# Patient Record
Sex: MALE | Race: WHITE | NOT HISPANIC OR LATINO | Employment: PART TIME | URBAN - METROPOLITAN AREA
[De-identification: names, ages, dates, MRNs, and addresses within clinical notes are randomized per-mention and may not be internally consistent; named-entity substitution may affect disease eponyms.]

---

## 2018-01-31 ENCOUNTER — HOSPITAL ENCOUNTER (EMERGENCY)
Facility: MEDICAL CENTER | Age: 38
End: 2018-02-01
Attending: EMERGENCY MEDICINE
Payer: COMMERCIAL

## 2018-01-31 ENCOUNTER — APPOINTMENT (OUTPATIENT)
Dept: RADIOLOGY | Facility: MEDICAL CENTER | Age: 38
End: 2018-01-31
Attending: EMERGENCY MEDICINE
Payer: COMMERCIAL

## 2018-01-31 DIAGNOSIS — S62.309A CLOSED FRACTURE OF METACARPAL BONE, UNSPECIFIED FRACTURE MORPHOLOGY, UNSPECIFIED METACARPAL, UNSPECIFIED PORTION OF METACARPAL, INITIAL ENCOUNTER: ICD-10-CM

## 2018-01-31 PROCEDURE — A9270 NON-COVERED ITEM OR SERVICE: HCPCS | Performed by: EMERGENCY MEDICINE

## 2018-01-31 PROCEDURE — 99284 EMERGENCY DEPT VISIT MOD MDM: CPT

## 2018-01-31 PROCEDURE — 73130 X-RAY EXAM OF HAND: CPT | Mod: RT

## 2018-01-31 PROCEDURE — 90471 IMMUNIZATION ADMIN: CPT

## 2018-01-31 PROCEDURE — 700111 HCHG RX REV CODE 636 W/ 250 OVERRIDE (IP): Performed by: EMERGENCY MEDICINE

## 2018-01-31 PROCEDURE — 700102 HCHG RX REV CODE 250 W/ 637 OVERRIDE(OP): Performed by: EMERGENCY MEDICINE

## 2018-01-31 PROCEDURE — 90715 TDAP VACCINE 7 YRS/> IM: CPT | Performed by: EMERGENCY MEDICINE

## 2018-01-31 RX ORDER — IBUPROFEN 600 MG/1
600 TABLET ORAL ONCE
Status: COMPLETED | OUTPATIENT
Start: 2018-01-31 | End: 2018-01-31

## 2018-01-31 RX ADMIN — IBUPROFEN 600 MG: 600 TABLET, FILM COATED ORAL at 23:30

## 2018-01-31 RX ADMIN — CLOSTRIDIUM TETANI TOXOID ANTIGEN (FORMALDEHYDE INACTIVATED), CORYNEBACTERIUM DIPHTHERIAE TOXOID ANTIGEN (FORMALDEHYDE INACTIVATED), BORDETELLA PERTUSSIS TOXOID ANTIGEN (GLUTARALDEHYDE INACTIVATED), BORDETELLA PERTUSSIS FILAMENTOUS HEMAGGLUTININ ANTIGEN (FORMALDEHYDE INACTIVATED), BORDETELLA PERTUSSIS PERTACTIN ANTIGEN, AND BORDETELLA PERTUSSIS FIMBRIAE 2/3 ANTIGEN 0.5 ML: 5; 2; 2.5; 5; 3; 5 INJECTION, SUSPENSION INTRAMUSCULAR at 23:31

## 2018-01-31 ASSESSMENT — PAIN SCALES - GENERAL: PAINLEVEL_OUTOF10: 8
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
## 2018-02-01 VITALS
RESPIRATION RATE: 16 BRPM | OXYGEN SATURATION: 99 % | TEMPERATURE: 97.5 F | BODY MASS INDEX: 23.32 KG/M2 | HEART RATE: 97 BPM | DIASTOLIC BLOOD PRESSURE: 79 MMHG | HEIGHT: 67 IN | SYSTOLIC BLOOD PRESSURE: 127 MMHG | WEIGHT: 148.59 LBS

## 2018-02-01 PROCEDURE — 29125 APPL SHORT ARM SPLINT STATIC: CPT

## 2018-02-01 PROCEDURE — 302874 HCHG BANDAGE ACE 2 OR 3""

## 2018-02-01 ASSESSMENT — PAIN SCALES - GENERAL: PAINLEVEL_OUTOF10: 7

## 2018-02-01 NOTE — ED NOTES
Pt cleared for d/c  dischg instructions given to pt  Verbally understands  Pt aware to f/u with workmans comp for further care and treatment of injury
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
## 2018-02-01 NOTE — DISCHARGE INSTRUCTIONS
Metacarpal Fracture  A metacarpal fracture is a break (fracture) of a bone in the hand. Metacarpals are the bones that extend from your knuckles to your wrist. In each hand, you have five metacarpal bones that connect your fingers and your thumb to your wrist.  Some hand fractures have bone pieces that are close together and stable (simple). These fractures may be treated with only a splint or cast. Hand fractures that have many pieces of broken bone (comminuted), unstable bone pieces (displaced), or a bone that breaks through the skin (compound) usually require surgery.  CAUSES  This injury may be caused by:  · A fall.  · A hard, direct hit to your hand.  · An injury that squeezes your knuckle, stretches your finger out of place, or crushes your hand.  RISK FACTORS  This injury is more likely to occur if:  · You play contact sports.  · You have certain bone diseases.  SYMPTOMS   Symptoms of this type of fracture develop soon after the injury. Symptoms may include:  · Swelling.  · Pain.  · Stiffness.  · Increased pain with movement.  · Bruising.  · Inability to move a finger.  · A shortened finger.  · A finger knuckle that looks sunken in.  · Unusual appearance of the hand or finger (deformity).  DIAGNOSIS   This injury may be diagnosed based on your signs and symptoms, especially if you had a recent hand injury. Your health care provider will perform a physical exam. He or she may also order X-rays to confirm the diagnosis.   TREATMENT   Treatment for this injury depends on the type of fracture you have and how severe it is. Possible treatments include:  · Non-reduction. This can be done if the bone does not need to be moved back into place. The fracture can be casted or splinted as it is.    · Closed reduction. If your bone is stable and can be moved back into place, you may only need to wear a cast or splint or have carmel taping.  · Closed reduction with internal fixation (CRIF). This is the most common  treatment. You may have this procedure if your bone can be moved back into place but needs more support. Wires, pins, or screws may be inserted through your skin to stabilize the fracture.  · Open reduction with internal fixation (ORIF). This may be needed if your fracture is severe and unstable. It involves surgery to move your bone back into the right position. Screws, wires, or plates are used to stabilize the fracture.  After all procedures, you may need to wear a cast or a splint for several weeks. You will also need to have follow-up X-rays to make sure that the bone is healing well and staying in position. After you no longer need your cast or splint, you may need physical therapy. This will help you to regain full movement and strength in your hand.   HOME CARE INSTRUCTIONS   If You Have a Cast:  · Do not stick anything inside the cast to scratch your skin. Doing that increases your risk of infection.  · Check the skin around the cast every day. Report any concerns to your health care provider. You may put lotion on dry skin around the edges of the cast. Do not apply lotion to the skin underneath the cast.  If You Have a Splint:  · Wear it as directed by your health care provider. Remove it only as directed by your health care provider.  · Loosen the splint if your fingers become numb and tingle, or if they turn cold and blue.  Bathing  · Cover the cast or splint with a watertight plastic bag to protect it from water while you take a bath or a shower. Do not let the cast or splint get wet.  Managing Pain, Stiffness, and Swelling  · If directed, apply ice to the injured area (if you have a splint, not a cast):  ¨ Put ice in a plastic bag.  ¨ Place a towel between your skin and the bag.  ¨ Leave the ice on for 20 minutes, 2-3 times a day.  · Move your fingers often to avoid stiffness and to lessen swelling.  · Raise the injured area above the level of your heart while you are sitting or lying  down.  Driving  · Do not drive or operate heavy machinery while taking pain medicine.  · Do not drive while wearing a cast or splint on a hand or foot that you use for driving.  Activity  · Return to your normal activities as directed by your health care provider. Ask your health care provider what activities are safe for you.  General Instructions  · Do not put pressure on any part of the cast or splint until it is fully hardened. This may take several hours.  · Keep the cast or splint clean and dry.  · Do not use any tobacco products, including cigarettes, chewing tobacco, or electronic cigarettes. Tobacco can delay bone healing. If you need help quitting, ask your health care provider.  · Take medicines only as directed by your health care provider.  · Keep all follow-up visits as directed by your health care provider. This is important.  SEEK MEDICAL CARE IF:   · Your pain is getting worse.  · You have redness, swelling, or pain in the injured area.    · You have fluid, blood, or pus coming from under your cast or splint.    · You notice a bad smell coming from under your cast or splint.    · You have a fever.    SEEK IMMEDIATE MEDICAL CARE IF:   · You develop a rash.    · You have trouble breathing.    · Your skin or nails on your injured hand turn blue or gray even after you loosen your splint.  · Your injured hand feels cold or becomes numb even after you loosen your splint.    · You develop severe pain under the cast or in your hand.     This information is not intended to replace advice given to you by your health care provider. Make sure you discuss any questions you have with your health care provider.     Document Released: 12/18/2006 Document Revised: 05/03/2016 Document Reviewed: 10/07/2015  [employer] Interactive Patient Education ©2016 [employer]
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
## 2018-02-01 NOTE — ED NOTES
Pt w/o apparent distress,  Circular abrasion noted to top of right hand with 2 lateral linear abrasions noted to underside of forearm. Pt wearing brace, removed for examine. describes pain 8/10. No other treatment or injuries

## 2018-02-05 ENCOUNTER — OCCUPATIONAL MEDICINE (OUTPATIENT)
Dept: OCCUPATIONAL MEDICINE | Facility: CLINIC | Age: 38
End: 2018-02-05
Payer: COMMERCIAL

## 2018-02-05 ENCOUNTER — APPOINTMENT (OUTPATIENT)
Dept: RADIOLOGY | Facility: IMAGING CENTER | Age: 38
End: 2018-02-05
Attending: PREVENTIVE MEDICINE
Payer: COMMERCIAL

## 2018-02-05 VITALS
OXYGEN SATURATION: 98 % | RESPIRATION RATE: 16 BRPM | HEART RATE: 91 BPM | WEIGHT: 153 LBS | DIASTOLIC BLOOD PRESSURE: 86 MMHG | BODY MASS INDEX: 24.01 KG/M2 | TEMPERATURE: 98 F | SYSTOLIC BLOOD PRESSURE: 108 MMHG | HEIGHT: 67 IN

## 2018-02-05 DIAGNOSIS — S60.221D CONTUSION OF RIGHT HAND, SUBSEQUENT ENCOUNTER: ICD-10-CM

## 2018-02-05 PROCEDURE — 73130 X-RAY EXAM OF HAND: CPT | Mod: TC,RT | Performed by: FAMILY MEDICINE

## 2018-02-05 PROCEDURE — 99203 OFFICE O/P NEW LOW 30 MIN: CPT | Performed by: PREVENTIVE MEDICINE
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
## 2021-06-04 ENCOUNTER — NON-PROVIDER VISIT (OUTPATIENT)
Dept: URGENT CARE | Facility: PHYSICIAN GROUP | Age: 41
End: 2021-06-04
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
## 2021-06-04 DIAGNOSIS — Z02.1 PRE-EMPLOYMENT DRUG SCREENING: ICD-10-CM

## 2021-06-04 LAB
AMP AMPHETAMINE: NORMAL
BAR BARBITURATES: NEGATIVE
BZO BENZODIAZEPINES: NEGATIVE
COC COCAINE: NORMAL
INT CON NEG: NORMAL
INT CON POS: NORMAL
MDMA ECSTASY: NORMAL
MET METHAMPHETAMINES: NORMAL
MTD METHADONE: NEGATIVE
OPI OPIATES: NEGATIVE
OXY OXYCODONE: NEGATIVE
PCP PHENCYCLIDINE: NEGATIVE
POC URINE DRUG SCREEN OCDRS: NORMAL
THC: NEGATIVE

## 2021-06-04 PROCEDURE — 80305 DRUG TEST PRSMV DIR OPT OBS: CPT | Performed by: PHYSICIAN ASSISTANT

## 2021-06-14 ENCOUNTER — NON-PROVIDER VISIT (OUTPATIENT)
Dept: OCCUPATIONAL MEDICINE | Facility: CLINIC | Age: 41
End: 2021-06-14

## 2021-06-14 DIAGNOSIS — Z02.83 ENCOUNTER FOR DRUG SCREENING: ICD-10-CM

## 2021-06-14 PROCEDURE — 8911 PR MRO FEE: Performed by: NURSE PRACTITIONER
